# Patient Record
Sex: FEMALE | Race: WHITE
[De-identification: names, ages, dates, MRNs, and addresses within clinical notes are randomized per-mention and may not be internally consistent; named-entity substitution may affect disease eponyms.]

---

## 2018-01-17 ENCOUNTER — HOSPITAL ENCOUNTER (OUTPATIENT)
Dept: HOSPITAL 83 - RAD | Age: 58
End: 2018-01-17
Attending: INTERNAL MEDICINE
Payer: COMMERCIAL

## 2018-01-17 DIAGNOSIS — R09.1: Primary | ICD-10-CM

## 2018-01-17 DIAGNOSIS — I10: ICD-10-CM

## 2019-12-03 ENCOUNTER — HOSPITAL ENCOUNTER (OUTPATIENT)
Dept: HOSPITAL 83 - RAD | Age: 59
Discharge: HOME | End: 2019-12-03
Attending: INTERNAL MEDICINE
Payer: COMMERCIAL

## 2019-12-03 DIAGNOSIS — R05: Primary | ICD-10-CM

## 2019-12-03 DIAGNOSIS — I10: ICD-10-CM

## 2020-02-12 ENCOUNTER — HOSPITAL ENCOUNTER (INPATIENT)
Dept: HOSPITAL 83 - 4E | Age: 60
LOS: 3 days | Discharge: HOME | DRG: 202 | End: 2020-02-15
Attending: INTERNAL MEDICINE | Admitting: INTERNAL MEDICINE
Payer: COMMERCIAL

## 2020-02-12 VITALS — HEIGHT: 62 IN | BODY MASS INDEX: 30.19 KG/M2 | WEIGHT: 164.06 LBS

## 2020-02-12 VITALS — DIASTOLIC BLOOD PRESSURE: 98 MMHG | SYSTOLIC BLOOD PRESSURE: 137 MMHG

## 2020-02-12 VITALS — DIASTOLIC BLOOD PRESSURE: 75 MMHG

## 2020-02-12 VITALS — DIASTOLIC BLOOD PRESSURE: 62 MMHG

## 2020-02-12 DIAGNOSIS — R07.89: ICD-10-CM

## 2020-02-12 DIAGNOSIS — F41.0: ICD-10-CM

## 2020-02-12 DIAGNOSIS — M79.7: ICD-10-CM

## 2020-02-12 DIAGNOSIS — Z88.8: ICD-10-CM

## 2020-02-12 DIAGNOSIS — J20.9: ICD-10-CM

## 2020-02-12 DIAGNOSIS — G89.29: ICD-10-CM

## 2020-02-12 DIAGNOSIS — F32.2: ICD-10-CM

## 2020-02-12 DIAGNOSIS — N17.9: ICD-10-CM

## 2020-02-12 DIAGNOSIS — I10: ICD-10-CM

## 2020-02-12 DIAGNOSIS — J45.21: Primary | ICD-10-CM

## 2020-02-12 LAB
ALBUMIN SERPL-MCNC: 3.8 GM/DL (ref 3.1–4.5)
ALP SERPL-CCNC: 50 U/L (ref 45–117)
ALT SERPL W P-5'-P-CCNC: 24 U/L (ref 12–78)
AST SERPL-CCNC: 28 IU/L (ref 3–35)
BASOPHILS # BLD AUTO: 0 10*3/UL (ref 0–0.1)
BASOPHILS NFR BLD AUTO: 0.1 % (ref 0–1)
BUN SERPL-MCNC: 21 MG/DL (ref 7–24)
CHLORIDE SERPL-SCNC: 107 MMOL/L (ref 98–107)
CREAT SERPL-MCNC: 1.11 MG/DL (ref 0.55–1.02)
EOSINOPHIL # BLD AUTO: 0 10*3/UL (ref 0–0.4)
EOSINOPHIL # BLD AUTO: 0.4 % (ref 1–4)
ERYTHROCYTE [DISTWIDTH] IN BLOOD BY AUTOMATED COUNT: 13.3 % (ref 0–14.5)
HCT VFR BLD AUTO: 37.8 % (ref 37–47)
HGB BLD-MCNC: 12.4 G/DL (ref 12–16)
LYMPHOCYTES # BLD AUTO: 2.4 10*3/UL (ref 1.3–4.4)
LYMPHOCYTES NFR BLD AUTO: 28.7 % (ref 27–41)
MCH RBC QN AUTO: 29.2 PG (ref 27–31)
MCHC RBC AUTO-ENTMCNC: 32.8 G/DL (ref 33–37)
MCV RBC AUTO: 88.9 FL (ref 81–99)
MONOCYTES # BLD AUTO: 0.6 10*3/UL (ref 0.1–1)
MONOCYTES NFR BLD MANUAL: 7.1 % (ref 3–9)
NEUT #: 5.2 10*3/UL (ref 2.3–7.9)
NEUT %: 63.2 % (ref 47–73)
NRBC BLD QL AUTO: 0 10*3/UL (ref 0–0)
PLATELET # BLD AUTO: 293 10*3/UL (ref 130–400)
PMV BLD AUTO: 10 FL (ref 9.6–12.3)
POTASSIUM SERPL-SCNC: 4 MMOL/L (ref 3.5–5.1)
PROT SERPL-MCNC: 7.3 GM/DL (ref 6.4–8.2)
RBC # BLD AUTO: 4.25 10*6/UL (ref 4.1–5.1)
SODIUM SERPL-SCNC: 140 MMOL/L (ref 136–145)
TROPONIN I SERPL-MCNC: < 0.015 NG/ML (ref ?–0.04)
WBC NRBC COR # BLD AUTO: 8.3 10*3/UL (ref 4.8–10.8)

## 2020-02-12 PROCEDURE — 3E073KZ INTRODUCTION OF OTHER DIAGNOSTIC SUBSTANCE INTO CORONARY ARTERY, PERCUTANEOUS APPROACH: ICD-10-PCS | Performed by: INTERNAL MEDICINE

## 2020-02-12 PROCEDURE — 4A02XM4 MEASUREMENT OF CARDIAC TOTAL ACTIVITY, EXTERNAL APPROACH: ICD-10-PCS | Performed by: INTERNAL MEDICINE

## 2020-02-13 VITALS — DIASTOLIC BLOOD PRESSURE: 66 MMHG

## 2020-02-13 VITALS — DIASTOLIC BLOOD PRESSURE: 72 MMHG | SYSTOLIC BLOOD PRESSURE: 106 MMHG

## 2020-02-13 VITALS — DIASTOLIC BLOOD PRESSURE: 86 MMHG

## 2020-02-13 VITALS — DIASTOLIC BLOOD PRESSURE: 60 MMHG | SYSTOLIC BLOOD PRESSURE: 154 MMHG

## 2020-02-14 VITALS — SYSTOLIC BLOOD PRESSURE: 114 MMHG | DIASTOLIC BLOOD PRESSURE: 53 MMHG

## 2020-02-14 VITALS — DIASTOLIC BLOOD PRESSURE: 58 MMHG

## 2020-02-14 VITALS — DIASTOLIC BLOOD PRESSURE: 62 MMHG

## 2020-02-14 VITALS — DIASTOLIC BLOOD PRESSURE: 67 MMHG

## 2020-02-14 VITALS — DIASTOLIC BLOOD PRESSURE: 69 MMHG | SYSTOLIC BLOOD PRESSURE: 120 MMHG

## 2020-02-14 LAB
BASOPHILS # BLD AUTO: 0 10*3/UL (ref 0–0.1)
BASOPHILS NFR BLD AUTO: 0.1 % (ref 0–1)
BUN SERPL-MCNC: 17 MG/DL (ref 7–24)
CHLORIDE SERPL-SCNC: 109 MMOL/L (ref 98–107)
CREAT SERPL-MCNC: 0.84 MG/DL (ref 0.55–1.02)
EOSINOPHIL # BLD AUTO: 0 % (ref 1–4)
EOSINOPHIL # BLD AUTO: 0 10*3/UL (ref 0–0.4)
ERYTHROCYTE [DISTWIDTH] IN BLOOD BY AUTOMATED COUNT: 13.4 % (ref 0–14.5)
HCT VFR BLD AUTO: 37.3 % (ref 37–47)
HGB BLD-MCNC: 12.3 G/DL (ref 12–16)
LYMPHOCYTES # BLD AUTO: 1.2 10*3/UL (ref 1.3–4.4)
LYMPHOCYTES NFR BLD AUTO: 10.3 % (ref 27–41)
MCH RBC QN AUTO: 29.9 PG (ref 27–31)
MCHC RBC AUTO-ENTMCNC: 33 G/DL (ref 33–37)
MCV RBC AUTO: 90.8 FL (ref 81–99)
MONOCYTES # BLD AUTO: 0.4 10*3/UL (ref 0.1–1)
MONOCYTES NFR BLD MANUAL: 3.3 % (ref 3–9)
NEUT #: 9.9 10*3/UL (ref 2.3–7.9)
NEUT %: 85.8 % (ref 47–73)
NRBC BLD QL AUTO: 0 10*3/UL (ref 0–0)
PLATELET # BLD AUTO: 311 10*3/UL (ref 130–400)
PMV BLD AUTO: 10.4 FL (ref 9.6–12.3)
POTASSIUM SERPL-SCNC: 4.1 MMOL/L (ref 3.5–5.1)
RBC # BLD AUTO: 4.11 10*6/UL (ref 4.1–5.1)
SODIUM SERPL-SCNC: 142 MMOL/L (ref 136–145)
WBC NRBC COR # BLD AUTO: 11.6 10*3/UL (ref 4.8–10.8)

## 2020-02-15 VITALS — DIASTOLIC BLOOD PRESSURE: 78 MMHG | SYSTOLIC BLOOD PRESSURE: 122 MMHG

## 2020-02-15 VITALS — DIASTOLIC BLOOD PRESSURE: 74 MMHG | SYSTOLIC BLOOD PRESSURE: 126 MMHG

## 2020-12-31 ENCOUNTER — HOSPITAL ENCOUNTER (OUTPATIENT)
Dept: HOSPITAL 83 - COVID19 | Age: 60
Discharge: HOME | End: 2020-12-31
Attending: INTERNAL MEDICINE
Payer: COMMERCIAL

## 2020-12-31 DIAGNOSIS — R05: ICD-10-CM

## 2020-12-31 DIAGNOSIS — Z20.828: Primary | ICD-10-CM

## 2022-01-31 ENCOUNTER — HOSPITAL ENCOUNTER (OUTPATIENT)
Dept: HOSPITAL 83 - US | Age: 62
End: 2022-01-31
Attending: INTERNAL MEDICINE
Payer: COMMERCIAL

## 2022-01-31 DIAGNOSIS — R74.8: Primary | ICD-10-CM

## 2022-01-31 DIAGNOSIS — Z90.49: ICD-10-CM

## 2022-10-28 ENCOUNTER — HOSPITAL ENCOUNTER (OUTPATIENT)
Dept: HOSPITAL 83 - RAD | Age: 62
Discharge: HOME | End: 2022-10-28
Attending: INTERNAL MEDICINE
Payer: MEDICARE

## 2022-10-28 DIAGNOSIS — M81.0: Primary | ICD-10-CM

## 2022-10-28 DIAGNOSIS — Z78.0: ICD-10-CM

## 2023-01-02 ENCOUNTER — HOSPITAL ENCOUNTER (OUTPATIENT)
Dept: HOSPITAL 83 - LAB | Age: 63
Discharge: HOME | End: 2023-01-02
Attending: INTERNAL MEDICINE
Payer: MEDICARE

## 2023-01-02 DIAGNOSIS — Z13.21: ICD-10-CM

## 2023-01-02 DIAGNOSIS — Z13.89: ICD-10-CM

## 2023-01-02 DIAGNOSIS — Z13.0: ICD-10-CM

## 2023-01-02 DIAGNOSIS — Z13.1: ICD-10-CM

## 2023-01-02 DIAGNOSIS — I10: ICD-10-CM

## 2023-01-02 DIAGNOSIS — Z13.6: ICD-10-CM

## 2023-01-02 DIAGNOSIS — Z13.220: ICD-10-CM

## 2023-01-02 DIAGNOSIS — E78.2: Primary | ICD-10-CM

## 2023-01-02 DIAGNOSIS — Z13.9: ICD-10-CM

## 2023-01-02 DIAGNOSIS — E55.9: ICD-10-CM

## 2023-01-02 DIAGNOSIS — Z13.228: ICD-10-CM

## 2023-01-02 DIAGNOSIS — E11.9: ICD-10-CM

## 2023-01-02 LAB
25(OH)D3 SERPL-MCNC: 87.4 NG/ML (ref 30–100)
ALP SERPL-CCNC: 57 U/L (ref 46–116)
ALT SERPL W P-5'-P-CCNC: 13 U/L (ref 10–49)
BASOPHILS # BLD AUTO: 0 10*3/UL (ref 0–0.1)
BASOPHILS NFR BLD AUTO: 0.3 % (ref 0–1)
BUN SERPL-MCNC: 13 MG/DL (ref 9–23)
CHLORIDE SERPL-SCNC: 106 MMOL/L (ref 98–107)
CHOLEST SERPL-MCNC: 150 MG/DL (ref ?–200)
EOSINOPHIL # BLD AUTO: 0.1 10*3/UL (ref 0–0.4)
EOSINOPHIL # BLD AUTO: 1.6 % (ref 1–4)
ERYTHROCYTE [DISTWIDTH] IN BLOOD BY AUTOMATED COUNT: 13.8 % (ref 0–14.5)
HCT VFR BLD AUTO: 35.4 % (ref 37–47)
LDLC SERPL DIRECT ASSAY-MCNC: 78 MG/DL (ref 9–159)
LYMPHOCYTES # BLD AUTO: 1.1 10*3/UL (ref 1.3–4.4)
LYMPHOCYTES NFR BLD AUTO: 30.2 % (ref 27–41)
MCH RBC QN AUTO: 29 PG (ref 27–31)
MCHC RBC AUTO-ENTMCNC: 33.9 G/DL (ref 33–37)
MCV RBC AUTO: 85.5 FL (ref 81–99)
MONOCYTES # BLD AUTO: 0.3 10*3/UL (ref 0.1–1)
MONOCYTES NFR BLD MANUAL: 7.9 % (ref 3–9)
NEUT #: 2.2 10*3/UL (ref 2.3–7.9)
NEUT %: 59.7 % (ref 47–73)
NRBC BLD QL AUTO: 0 10*3/UL (ref 0–0)
PLATELET # BLD AUTO: 187 10*3/UL (ref 130–400)
PMV BLD AUTO: 10.9 FL (ref 9.6–12.3)
POTASSIUM SERPL-SCNC: 3.7 MMOL/L (ref 3.4–5.1)
PROT SERPL-MCNC: 6.9 GM/DL (ref 6–8)
RBC # BLD AUTO: 4.14 10*6/UL (ref 4.1–5.1)
T4 FREE SERPL-MCNC: 1.3 NG/DL (ref 0.89–1.76)
TRIGL SERPL-MCNC: 174 MG/DL (ref ?–150)
TSH SERPL DL<=0.005 MIU/L-ACNC: 1.46 UIU/ML (ref 0.55–4.78)
VITAMIN B12: 437 PG/ML (ref 211–911)
WBC NRBC COR # BLD AUTO: 3.7 10*3/UL (ref 4.8–10.8)

## 2024-01-08 ENCOUNTER — HOSPITAL ENCOUNTER (OUTPATIENT)
Dept: HOSPITAL 83 - LAB | Age: 64
Discharge: HOME | End: 2024-01-08
Attending: PHYSICIAN ASSISTANT
Payer: COMMERCIAL

## 2024-01-08 DIAGNOSIS — R63.4: Primary | ICD-10-CM

## 2024-01-08 LAB
BASOPHILS # BLD AUTO: 0 10*3/UL (ref 0–0.1)
BASOPHILS NFR BLD AUTO: 0 % (ref 0–1)
EOSINOPHIL # BLD AUTO: 0.1 10*3/UL (ref 0–0.4)
EOSINOPHIL # BLD AUTO: 1.6 % (ref 1–4)
ERYTHROCYTE [DISTWIDTH] IN BLOOD BY AUTOMATED COUNT: 14 % (ref 0–14.5)
HCT VFR BLD AUTO: 39.9 % (ref 37–47)
LYMPHOCYTES # BLD AUTO: 1.5 10*3/UL (ref 1.3–4.4)
LYMPHOCYTES NFR BLD AUTO: 38.3 % (ref 27–41)
MCH RBC QN AUTO: 28.7 PG (ref 27–31)
MCHC RBC AUTO-ENTMCNC: 32.3 G/DL (ref 33–37)
MCV RBC AUTO: 88.7 FL (ref 81–99)
MONOCYTES # BLD AUTO: 0.3 10*3/UL (ref 0.1–1)
MONOCYTES NFR BLD MANUAL: 7.6 % (ref 3–9)
NEUT #: 2 10*3/UL (ref 2.3–7.9)
NEUT %: 52.2 % (ref 47–73)
NRBC BLD QL AUTO: 0 10*3/UL (ref 0–0)
PLATELET # BLD AUTO: 186 10*3/UL (ref 130–400)
PMV BLD AUTO: 10.6 FL (ref 9.6–12.3)
RBC # BLD AUTO: 4.5 10*6/UL (ref 4.1–5.1)
WBC NRBC COR # BLD AUTO: 3.8 10*3/UL (ref 4.8–10.8)

## 2024-02-12 PROCEDURE — 88305 TISSUE EXAM BY PATHOLOGIST: CPT | Performed by: DERMATOLOGY

## 2024-02-12 PROCEDURE — 88305 TISSUE EXAM BY PATHOLOGIST: CPT

## 2024-02-13 ENCOUNTER — LAB REQUISITION (OUTPATIENT)
Dept: DERMATOPATHOLOGY | Facility: CLINIC | Age: 64
End: 2024-02-13
Payer: MEDICARE

## 2024-02-13 DIAGNOSIS — D48.5 NEOPLASM OF UNCERTAIN BEHAVIOR OF SKIN: ICD-10-CM

## 2024-02-14 LAB
LABORATORY COMMENT REPORT: NORMAL
PATH REPORT.FINAL DX SPEC: NORMAL
PATH REPORT.GROSS SPEC: NORMAL
PATH REPORT.MICROSCOPIC SPEC OTHER STN: NORMAL
PATH REPORT.RELEVANT HX SPEC: NORMAL
PATH REPORT.TOTAL CANCER: NORMAL

## 2024-04-12 ENCOUNTER — HOSPITAL ENCOUNTER (OUTPATIENT)
Dept: HOSPITAL 83 - RAD | Age: 64
Discharge: HOME | End: 2024-04-12
Attending: INTERNAL MEDICINE
Payer: COMMERCIAL

## 2024-04-12 DIAGNOSIS — M25.542: Primary | ICD-10-CM

## 2024-06-12 ENCOUNTER — HOSPITAL ENCOUNTER (OUTPATIENT)
Dept: HOSPITAL 83 - US | Age: 64
Discharge: HOME | End: 2024-06-12
Attending: INTERNAL MEDICINE
Payer: COMMERCIAL

## 2024-06-12 DIAGNOSIS — I65.23: Primary | ICD-10-CM

## 2024-06-12 DIAGNOSIS — I10: ICD-10-CM

## 2024-06-12 DIAGNOSIS — G93.89: ICD-10-CM

## 2024-06-21 ENCOUNTER — HOSPITAL ENCOUNTER (OUTPATIENT)
Dept: HOSPITAL 83 - LAB | Age: 64
Discharge: HOME | End: 2024-06-21
Attending: INTERNAL MEDICINE
Payer: COMMERCIAL

## 2024-06-21 DIAGNOSIS — Z01.812: Primary | ICD-10-CM

## 2024-06-25 ENCOUNTER — HOSPITAL ENCOUNTER (OUTPATIENT)
Dept: HOSPITAL 83 - CT | Age: 64
Discharge: HOME | End: 2024-06-25
Attending: INTERNAL MEDICINE
Payer: COMMERCIAL

## 2024-06-25 DIAGNOSIS — M47.812: ICD-10-CM

## 2024-06-25 DIAGNOSIS — I77.89: Primary | ICD-10-CM

## 2024-06-25 DIAGNOSIS — R42: ICD-10-CM

## 2024-08-13 PROCEDURE — 88305 TISSUE EXAM BY PATHOLOGIST: CPT

## 2024-08-14 ENCOUNTER — LAB REQUISITION (OUTPATIENT)
Dept: DERMATOPATHOLOGY | Facility: CLINIC | Age: 64
End: 2024-08-14
Payer: MEDICARE

## 2024-08-14 DIAGNOSIS — D48.5 NEOPLASM OF UNCERTAIN BEHAVIOR OF SKIN: ICD-10-CM

## 2025-04-30 ENCOUNTER — EVALUATION (OUTPATIENT)
Dept: PHYSICAL THERAPY | Age: 65
End: 2025-04-30
Payer: MEDICARE

## 2025-04-30 DIAGNOSIS — R42 DIZZINESS: Primary | ICD-10-CM

## 2025-04-30 PROCEDURE — 97530 THERAPEUTIC ACTIVITIES: CPT | Performed by: PHYSICAL THERAPIST

## 2025-04-30 PROCEDURE — 97162 PT EVAL MOD COMPLEX 30 MIN: CPT | Performed by: PHYSICAL THERAPIST

## 2025-04-30 NOTE — PROGRESS NOTES
[facility]   Phone: [phone]   Fax: [phone]           Date:  2025   Patient: Sri Barboza  : 1960  MRN: [id number]  Referring Provider: No referring provider defined for this encounter.     Medical Diagnosis:      Diagnosis Orders   1. Dizziness             SUBJECTIVE:     Onset date: Lonnie 15, 2025    Onset:: Sudden onset    Mechanism of Injury / History: Pt reports sudden onset of feeling sick/vomiting, vertigo, and headache. Pt went to the ER where stroke was rolled out. Pt with h/o fibromyalgia. Pt reports a bout of Covid in Oct 2024.    Previous PT: none    Medical Management for Current Problem:  N/A    Chief complaint: difficulty walking, falls, dizziness    Behavior: condition is staying the same    Description Of Symptoms:  symptoms present with movements of the head; pt reports nausea, but has not had any vomiting since initial onset of symptoms; pt reports feeling uneasy, like she's going to fall; pt reports blurred vision, she denies any tinnitus or hearing loss    Pain: intermittent  Current: 0/10         Symptom Type/Quality: N/A  Location:: headache, feels like neck is tired        Imaging results: No results found.    Past Medical History:  No past medical history on file.  No past surgical history on file.    Medications:   No current outpatient medications on file.     No current facility-administered medications for this visit.       Occupation: retired.     Exercise regimen: none    Hobbies: coaches a summer track team    Patient Goals: return to driving , return to prior activities, get rid of dizziness    Precautions/Contraindications: falls risk    Dizziness Handicap Inventory:  74/100      OBJECTIVE:     Vertebral Artery Testing in sitting: N/A due to limited cervical movement    Posture/Alignment: pt alignment is grossly WFL, however, posture is very rigid    Oculomotor and Vestibulo-Ocular Examination:   Spontaneous Nystagmus:  none

## 2025-04-30 NOTE — PROGRESS NOTES
[facility]          Phone: [phone] Fax: [phone]    Physical Therapy Daily Treatment Note  Date:  2025    Patient Name:  Sri Barboza    :  1960  MRN: [id number]    Evaluating Therapist:  Prachi Holcomb, PT [id number]    Restrictions/Precautions:    Diagnosis:     Diagnosis Orders   1. Dizziness          Treatment Diagnosis:    Insurance/Certification information:  [employer] Medicare  Referring Physician:  MITZI Chahal  Plan of care signed (Y/N):    Visit# / total visits:    Pain level: 0/10   Time In:  1018  Time Out:  1110    Subjective:  See evaluation    Exercises:  Exercise/Equipment Resistance/Repetitions Other comments     saccades       Visual tracking       VORx1       targets       Head movements eyes closed                                                                                                            Other Therapeutic Activities:  PT evaluation completed. Pt educated in role of physical therapy in treating condition. Instructed pt and  in Saccades for HEP and in proper dosing and monitoring of symptoms with HEP.    Unable to assess [place]-Hallpike or Roll test today due to severity of symptoms and time limitations (pt arrived > 10 minutes late to appointment). Will assess at a future visit.    Home Exercise Program:  25 - saccades    Manual Treatments:  MFR to cervical spine PRN    Modalities:  N/A     Time-in Time-out Total Time   78448  Evaluation Low Complexity      89638  Evaluation Med Complexity 1018 1100 42   24148  Evaluation High Complexity      29558  Ther Ex      44997  Neuro Re-ed        53328  Ther Activities   1100 1110 10   36043  Manual Therapy       76133  E-stim       22977  Ultrasound            Session 1018 1110 52       Treatment/Activity Tolerance:  [] Patient tolerated treatment well [] Patient limited by fatigue  [] Patient limited by pain  [] Patient limited by other medical complications  [x]

## 2025-05-07 ENCOUNTER — TREATMENT (OUTPATIENT)
Dept: PHYSICAL THERAPY | Age: 65
End: 2025-05-07
Payer: MEDICARE

## 2025-05-07 DIAGNOSIS — R42 DIZZINESS: Primary | ICD-10-CM

## 2025-05-07 PROCEDURE — 97140 MANUAL THERAPY 1/> REGIONS: CPT | Performed by: PHYSICAL THERAPIST

## 2025-05-07 PROCEDURE — 97112 NEUROMUSCULAR REEDUCATION: CPT | Performed by: PHYSICAL THERAPIST

## 2025-05-07 PROCEDURE — 97530 THERAPEUTIC ACTIVITIES: CPT | Performed by: PHYSICAL THERAPIST

## 2025-05-07 NOTE — PROGRESS NOTES
[facility]          Phone: [phone] Fax: [phone]    Physical Therapy Daily Treatment Note  Date:  2025    Patient Name:  Sri Barboza    :  1960  MRN: [id number]    Evaluating Therapist:  Prachi Holcomb, PT [id number]    Restrictions/Precautions:    Diagnosis:     Diagnosis Orders   1. Dizziness          Treatment Diagnosis:    Insurance/Certification information:  [employer] Medicare  Referring Physician:  MITZI Chahal  Plan of care signed (Y/N):    Visit# / total visits:    Pain level: ?/10   Time In:  1003  Time Out:  1100    Subjective:  Pt reports compliance with HEP. Pt presents with log of exercises (how many reps, symptoms with each activity). Pt started with 6-7 reps and able to increase to 11-12 by end of week. Pt reports significant pain and tightness throughout the neck and up into the jaw.    Exercises:  Exercise/Equipment Resistance/Repetitions Other comments     saccades Seated, horizontal x 10 reps, vertical x 7 reps Small ROM, mod dizziness     Visual tracking Seated, x 5 reps each horizontal and vertical Small ROM, slow pace, mod dizziness     VORx1       targets       Head movements eyes closed                                                                                                            Other Therapeutic Activities:  [place]-Hallpike assessed bilaterally. Pt demonstrating significant discomfort and dizziness with testing on both side, but otherwise, testing was (-). Reviewed potential causes of symptoms and progression of therapeutic activities to treat condition.    Home Exercise Program:  25 - saccades    Manual Treatments:  MFR to cervical spine, mandibular release, and B ear pull x 15 minutes    Modalities:  N/A     Time-in Time-out Total Time   80337  Evaluation Low Complexity      86672  Evaluation Med Complexity      26420  Evaluation High Complexity      75517  Ther Ex      36538  Neuro Re-ed   1020 1045 25

## 2025-05-14 ENCOUNTER — TREATMENT (OUTPATIENT)
Dept: PHYSICAL THERAPY | Age: 65
End: 2025-05-14
Payer: MEDICARE

## 2025-05-14 DIAGNOSIS — R42 DIZZINESS: Primary | ICD-10-CM

## 2025-05-14 PROCEDURE — 97140 MANUAL THERAPY 1/> REGIONS: CPT | Performed by: PHYSICAL THERAPIST

## 2025-05-14 PROCEDURE — 97530 THERAPEUTIC ACTIVITIES: CPT | Performed by: PHYSICAL THERAPIST

## 2025-05-14 PROCEDURE — 97112 NEUROMUSCULAR REEDUCATION: CPT | Performed by: PHYSICAL THERAPIST

## 2025-05-14 NOTE — PROGRESS NOTES
incorporate the other exercise at a later date)    Manual Treatments:  MFR to cervical spine, and B ear pull x 15 minutes    Modalities:  N/A     Time-in Time-out Total Time   08387  Evaluation Low Complexity      53060  Evaluation Med Complexity      12973  Evaluation High Complexity      90012  Ther Ex      72509  Neuro Re-ed   1015 1045 30   26673  Ther Activities   1005 1015 10   12618  Manual Therapy  1045 1100 15   80391  E-stim       23364  Ultrasound            Session 1005 1100 55       Treatment/Activity Tolerance:  [] Patient tolerated treatment well [] Patient limited by fatigue  [] Patient limited by pain  [] Patient limited by other medical complications  [x] Other: limited by severity of symptoms    Prognosis: [x] Good [x] Fair  [] Poor    Patient Requires Follow-up: [x] Yes  [] No    Plan:   [x] Continue per plan of care [] Alter current plan (see comments)  [] Plan of care initiated [] Hold pending MD visit [] Discharge  Plan for Next Session:        Electronically signed by:  Prachi Holcomb, PT, [id number]

## 2025-05-19 PROCEDURE — 88305 TISSUE EXAM BY PATHOLOGIST: CPT | Performed by: DERMATOLOGY

## 2025-05-21 ENCOUNTER — TREATMENT (OUTPATIENT)
Dept: PHYSICAL THERAPY | Age: 65
End: 2025-05-21
Payer: MEDICARE

## 2025-05-21 ENCOUNTER — LAB REQUISITION (OUTPATIENT)
Dept: DERMATOPATHOLOGY | Facility: CLINIC | Age: 65
End: 2025-05-21
Payer: MEDICARE

## 2025-05-21 DIAGNOSIS — D48.5 NEOPLASM OF UNCERTAIN BEHAVIOR OF SKIN: ICD-10-CM

## 2025-05-21 DIAGNOSIS — R42 DIZZINESS: Primary | ICD-10-CM

## 2025-05-21 PROCEDURE — 97140 MANUAL THERAPY 1/> REGIONS: CPT | Performed by: PHYSICAL THERAPIST

## 2025-05-21 PROCEDURE — 97112 NEUROMUSCULAR REEDUCATION: CPT | Performed by: PHYSICAL THERAPIST

## 2025-05-21 NOTE — PROGRESS NOTES
[facility]          Phone: [phone] Fax: [phone]    Physical Therapy Daily Treatment Note  Date:  2025    Patient Name:  Sri Barboza    :  1960  MRN: [id number]    Evaluating Therapist:  Prachi Holcomb, PT [id number]    Restrictions/Precautions:    Diagnosis:     Diagnosis Orders   1. Dizziness          Treatment Diagnosis:    Insurance/Certification information:  t Medicare  Referring Physician:  MITZI Chahal  Plan of care signed (Y/N):    Visit# / total visits:    Pain level: ?/10   Time In:  09  Time Out:  1052    Subjective:  Pt reports she is feeling a lot better. Presents to therapy ambulating without her cane. She reports she is doing all of her exercises 2x/day.    Exercises:  Exercise/Equipment Resistance/Repetitions Other comments     saccades Seated, horizontal x 20 reps, vertical x 20 reps Increased ROM, increased speed, mild dizziness (3/10)     Visual tracking Seated, x 10 reps horizontal; x 10 reps vertical Small ROM, slow pace, mod dizziness     VORx1 Seated, horizontal x 10 reps Slower pace (but faster than previous visit), good ROM, mod dizziness and some difficulty with maintaining eyes on target (improved with slower speed)     targets       Head movements eyes closed                                                                                                            Other Therapeutic Activities:  Improved pacing with exercises. Pt continues to require extended periods of time to recover between exercises (2-3 minutes for saccades, 4-5 minutes for VOR and visual tracking). Overall tolerance improved from previous visit    Home Exercise Program:  25 - saccades; 5/15/25 - VORx1, visual tracking (adding only one exercise for the next few days and then incorporate the other exercise at a later date)    Manual Treatments:  MFR to cervical spine, and B ear pull x 16 minutes    Modalities:  N/A     Time-in Time-out

## 2025-05-28 ENCOUNTER — TREATMENT (OUTPATIENT)
Dept: PHYSICAL THERAPY | Age: 65
End: 2025-05-28
Payer: MEDICARE

## 2025-05-28 DIAGNOSIS — R42 DIZZINESS: Primary | ICD-10-CM

## 2025-05-28 PROCEDURE — 97112 NEUROMUSCULAR REEDUCATION: CPT | Performed by: PHYSICAL THERAPIST

## 2025-05-28 PROCEDURE — 97140 MANUAL THERAPY 1/> REGIONS: CPT | Performed by: PHYSICAL THERAPIST

## 2025-05-28 NOTE — PROGRESS NOTES
[facility]          Phone: [phone] Fax: [phone]    Physical Therapy Daily Treatment Note  Date:  2025    Patient Name:  Sri Barboza    :  1960  MRN: [id number]    Evaluating Therapist:  Prachi Holcomb, PT [id number]    Restrictions/Precautions:    Diagnosis:     Diagnosis Orders   1. Dizziness          Treatment Diagnosis:    Insurance/Certification information:  [employer] Medicare  Referring Physician:  MITZI Chahal  Plan of care signed (Y/N):    Visit# / total visits:    Pain level: ?/10   Time In:  1004  Time Out:  1055    Subjective:  Pt reports she has been tolerating exercises up to 20 reps. Pt stated that she feels she is doing better.    Exercises:  Exercise/Equipment Resistance/Repetitions Other comments     saccades Seated, horizontal x 20 reps, vertical x 20 reps Good speed, mild dizziness     Visual tracking Seated, x 20 reps horizontal; x 10 reps vertical Faster pace, mild to mod dizziness     VORx1 Seated, horizontal x 20 reps Slower pace, some difficulty with maintaining eyes on target, mild dizziness     targets Seated, horizontal only, stopping at each target left, fwd, right, x 8 reps Mild to mod dizziness, some difficulty focusing on target     Head movements eyes closed       Seated trunk bends X5 reps Slow pace, mod dizziness                                                                                                   Other Therapeutic Activities:  Decreased recovery time between exercises. Pt ambulating at a faster pace with less guarding.    Home Exercise Program:  25 - saccades; 5/15/25 - VORx1, visual tracking (adding only one exercise for the next few days and then incorporate the other exercise at a later date); 25 - targets, seated trunk bends    Manual Treatments:  MFR to cervical spine, and B ear pull x 15 minutes    Modalities:  N/A     Time-in Time-out Total Time   40492  Evaluation Low Complexity

## 2025-06-04 ENCOUNTER — TREATMENT (OUTPATIENT)
Dept: PHYSICAL THERAPY | Age: 65
End: 2025-06-04
Payer: MEDICARE

## 2025-06-04 DIAGNOSIS — R42 DIZZINESS: Primary | ICD-10-CM

## 2025-06-04 PROCEDURE — 97112 NEUROMUSCULAR REEDUCATION: CPT | Performed by: PHYSICAL THERAPIST

## 2025-06-04 PROCEDURE — 97140 MANUAL THERAPY 1/> REGIONS: CPT | Performed by: PHYSICAL THERAPIST

## 2025-06-04 NOTE — PROGRESS NOTES
[facility]          Phone: [phone] Fax: [phone]    Physical Therapy Daily Treatment Note  Date:  2025    Patient Name:  Sri Barboza    :  1960  MRN: [id number]    Evaluating Therapist:  Prachi Holcomb, PT [id number]    Restrictions/Precautions:    Diagnosis:     Diagnosis Orders   1. Dizziness          Treatment Diagnosis:    Insurance/Certification information:  t Medicare  Referring Physician:  MITZI Chahal  Plan of care signed (Y/N):    Visit# / total visits:    Pain level: ?/10   Time In:  0959  Time Out:  1104    Subjective:  Pt reports she is doing much better. She reports that she has been able to do most exercises up to 20 reps. She states that she is not experiencing a wave of dizziness when walking into a new room.    Exercises:  Exercise/Equipment Resistance/Repetitions Other comments     saccades Seated reading words cards, 1x through each horizontal and vertical    Standing, no reading, x 20 reps each horizontal and vertical No dizziness     Visual tracking Seated reading word card 1x through each horizontal and vertical    Standing, no reading, x 20 reps each horizontal and vertical No dizziness     VORx1 Seated, reading number card, 1x through horizontal    Standing, no reading, x 20 reps horizontal No dizziness, mild difficulty with task     targets Seated, horizontal, stopping at each target left, fwd, right, x 20 reps; vertical, stopping at 3 targets, x 18 reps Mild dizziness     Head movements eyes closed Seated, horizontal x 9 reps; vertical x 10 reps Mild dizziness     Seated trunk bends X20 reps No dizziness                                                                                                   Other Therapeutic Activities:  Good tolerance to all exercises today    Home Exercise Program:  25 - saccades; 5/15/25 - VORx1, visual tracking (adding only one exercise for the next few days and then incorporate

## 2025-06-11 ENCOUNTER — TREATMENT (OUTPATIENT)
Dept: PHYSICAL THERAPY | Age: 65
End: 2025-06-11
Payer: MEDICARE

## 2025-06-11 DIAGNOSIS — R42 DIZZINESS: Primary | ICD-10-CM

## 2025-06-11 PROCEDURE — 97140 MANUAL THERAPY 1/> REGIONS: CPT | Performed by: PHYSICAL THERAPIST

## 2025-06-11 PROCEDURE — 97112 NEUROMUSCULAR REEDUCATION: CPT | Performed by: PHYSICAL THERAPIST

## 2025-06-11 NOTE — PROGRESS NOTES
[facility]          Phone: [phone] Fax: [phone]    Physical Therapy Daily Treatment Note  Date:  2025    Patient Name:  Sri Barboza    :  1960  MRN: [id number]    Evaluating Therapist:  Prachi Holcomb, PT [id number]    Restrictions/Precautions:    Diagnosis:     Diagnosis Orders   1. Dizziness          Treatment Diagnosis:    Insurance/Certification information:  [employer]na Medicare  Referring Physician:  MITZI Chahal  Plan of care signed (Y/N):    Visit# / total visits:    Pain level: ?/10   Time In:  1000  Time Out:  1053    Subjective:  Pt reports she is feeling good, states she has been squatting down to  objects off the floor without any issues.    Exercises:  Exercise/Equipment Resistance/Repetitions Other comments     saccades Seated on ball, no reading, no bounce, horizontal and vertical x 20 reps each     Sitting on ball, no bounce, reading number cards, 1x through each horizontal and vertical No dizziness     Visual tracking Seated on ball, no reading, no bounce, horizontal and vertical x 20 reps each  No dizziness     VORx1 Seated on ball, no reading, no bounce, horizontal and vertical x 20 reps each     Sitting on ball, no bounce, reading number cards, 1x through each horizontal and vertical No dizziness, improved success with task     targets Seated, horizontal and vertical, x 20 reps each, no stopping between targets Mild dizziness     Head movements eyes closed Seated, horizontal x 13 reps; vertical x 15 reps Mild dizziness                                                                                                   Other Therapeutic Activities:  Good tolerance to all exercises today    Home Exercise Program:  25 - saccades; 5/15/25 - VORx1, visual tracking (adding only one exercise for the next few days and then incorporate the other exercise at a later date); 25 - targets, seated trunk bends; 25 - progress

## 2025-06-18 ENCOUNTER — TREATMENT (OUTPATIENT)
Dept: PHYSICAL THERAPY | Age: 65
End: 2025-06-18
Payer: MEDICARE

## 2025-06-18 DIAGNOSIS — R42 DIZZINESS: Primary | ICD-10-CM

## 2025-06-18 PROCEDURE — 97112 NEUROMUSCULAR REEDUCATION: CPT | Performed by: PHYSICAL THERAPIST

## 2025-06-18 PROCEDURE — 97530 THERAPEUTIC ACTIVITIES: CPT | Performed by: PHYSICAL THERAPIST

## 2025-06-18 PROCEDURE — 97140 MANUAL THERAPY 1/> REGIONS: CPT | Performed by: PHYSICAL THERAPIST

## 2025-06-18 NOTE — PROGRESS NOTES
[facility]                Phone: [phone]  Fax: [phone]    Physical Therapy  Out Patient Progress Report      Date:  2025    Patient Name:  Sri Barboza    :  1960  MRN: [id number]      DIAGNOSIS:     Diagnosis Orders   1. Dizziness          REFERRING PHYSICIAN:  MITZI Chahal  CERTIFICATION PERIOD:  25 to 25    ATTENDANCE:  Pt has attended 8 of 8 scheduled treatments from 25 to 25.  TREATMENTS RECEIVED:  manual therapy, vestibular rehab, neuromuscular reeducation, education in a HEP    INITIAL STATUS:  ROM cervical spine: minimal ROM in all planes due to pain and guarding against vertigo  Dynamic Gait Index:   Dizziness Handicap Inventory: 74/100  Gait: slow, rigid, and guarded to minimize symptoms  with use of spc    CURRENT STATUS:  ROM cervical spine: grossly 30° rotation bilaterally, flex and extension grossly 10°, limited primarily by fibromyalgia and not vertigo  Dynamic Gait Index:   Dizziness Handicap Inventory: 4100  Gait: fluid, independent gait without device        COMMENTS AND RECOMMENDATIONS:  Pt demonstrate excellent progress with function and symptoms. Recommend continued therapy, 1x/week x 4 more weeks to continue to progress function and return pt to prior to activities, with new goals as follows:    Increase DGI to 22 or   Pt will resume driving around familiar routes without onset of symptoms          Thank you for the opportunity to work with your patient. If you have questions or comments, please feel free to contact me by phone or fax.      Electronically Signed by: Prachi Holcomb PT, [id number]  2025            ___________________________________  2025    Physician     Date      Bilobed Transposition Flap Text: The defect edges were debeveled with a #15 scalpel blade.  Given the location of the defect and the proximity to free margins a bilobed transposition flap was deemed most appropriate.  Using a sterile surgical marker, an appropriate bilobe flap drawn around the defect.    The area thus outlined was incised deep to adipose tissue with a #15 scalpel blade.  The skin margins were undermined to an appropriate distance in all directions utilizing iris scissors.

## 2025-06-18 NOTE — PROGRESS NOTES
[facility]          Phone: [phone] Fax: [phone]    Physical Therapy Daily Treatment Note  Date:  2025    Patient Name:  Sri Barboza    :  1960  MRN: [id number]    Evaluating Therapist:  Prachi Holcomb, PT [id number]    Restrictions/Precautions:    Diagnosis:     Diagnosis Orders   1. Dizziness          Treatment Diagnosis:    Insurance/Certification information:  t Medicare  Referring Physician:  MITZI Chahal  Plan of care signed (Y/N):    Visit# / total visits:    Pain level: ?/10   Time In:  1000  Time Out:  1053    Subjective:  Pt reports that she is doing well. She reports that she continues to progress her HEP and has been more active without c/o dizziness.    Exercises:  Exercise/Equipment Resistance/Repetitions Other comments     saccades Seated on ball, with bounce, horizontal and vertical x 20 reps each  No dizziness     Visual tracking Seated on ball, no reading, with bounce, horizontal and vertical x 20 reps each  No dizziness     VORx1 Sitting on ball, with bounce, x 20 reps horizontal No dizziness, improved success with task     targets Seated on ball, no bounce, reading number cards, 1x through each horizontal and vertical No dizziness     Head movements eyes closed Seated, horizontal x 20 reps; vertical x 20 reps Mild dizziness     Gait with head movements  2 laps each horizontal and vertical Mild dizziness and disruption of gait pattern                                                                                            Other Therapeutic Activities:  Good tolerance to all exercises today. Reassessment performed today to update POC. See report for details. Discussed pt resuming driving, starting close to home with familiar surroundings, and slowly increasing her radius as tolerates    Home Exercise Program:  25 - saccades; 5/15/25 - VORx1, visual tracking (adding only one exercise for the next few days and then

## 2025-08-06 ENCOUNTER — TREATMENT (OUTPATIENT)
Dept: PHYSICAL THERAPY | Age: 65
End: 2025-08-06

## 2025-08-06 DIAGNOSIS — R42 DIZZINESS: Primary | ICD-10-CM

## 2025-08-13 ENCOUNTER — TREATMENT (OUTPATIENT)
Dept: PHYSICAL THERAPY | Age: 65
End: 2025-08-13
Payer: MEDICARE

## 2025-08-13 DIAGNOSIS — R42 DIZZINESS: Primary | ICD-10-CM

## 2025-08-13 PROCEDURE — 97530 THERAPEUTIC ACTIVITIES: CPT | Performed by: PHYSICAL THERAPIST
